# Patient Record
Sex: MALE | URBAN - METROPOLITAN AREA
[De-identification: names, ages, dates, MRNs, and addresses within clinical notes are randomized per-mention and may not be internally consistent; named-entity substitution may affect disease eponyms.]

---

## 2024-04-12 ENCOUNTER — HOSPITAL ENCOUNTER (OUTPATIENT)
Age: 34
Setting detail: SPECIMEN
Discharge: HOME | End: 2024-04-12

## 2024-04-12 ENCOUNTER — OFFICE VISIT (OUTPATIENT)
Dept: FAMILY MEDICINE CLINIC | Age: 34
End: 2024-04-12
Payer: COMMERCIAL

## 2024-04-12 VITALS
WEIGHT: 221 LBS | SYSTOLIC BLOOD PRESSURE: 130 MMHG | HEART RATE: 96 BPM | HEIGHT: 72 IN | DIASTOLIC BLOOD PRESSURE: 80 MMHG | TEMPERATURE: 98.1 F | BODY MASS INDEX: 29.93 KG/M2

## 2024-04-12 DIAGNOSIS — Z11.59 ENCOUNTER FOR HCV SCREENING TEST FOR HIGH RISK PATIENT: ICD-10-CM

## 2024-04-12 DIAGNOSIS — Z11.59 NEED FOR HEPATITIS B SCREENING TEST: ICD-10-CM

## 2024-04-12 DIAGNOSIS — B20 HISTORY OF HIV INFECTION (HCC): ICD-10-CM

## 2024-04-12 DIAGNOSIS — Z59.9 FINANCIAL DIFFICULTIES: ICD-10-CM

## 2024-04-12 DIAGNOSIS — Z86.39 HISTORY OF VITAMIN D DEFICIENCY: ICD-10-CM

## 2024-04-12 DIAGNOSIS — R53.82 CHRONIC FATIGUE: ICD-10-CM

## 2024-04-12 DIAGNOSIS — Z91.89 ENCOUNTER FOR HCV SCREENING TEST FOR HIGH RISK PATIENT: ICD-10-CM

## 2024-04-12 DIAGNOSIS — Z11.4 ENCOUNTER FOR SCREENING FOR HIV: ICD-10-CM

## 2024-04-12 DIAGNOSIS — F32.A DEPRESSION, UNSPECIFIED DEPRESSION TYPE: ICD-10-CM

## 2024-04-12 DIAGNOSIS — Z76.89 ENCOUNTER TO ESTABLISH CARE WITH NEW DOCTOR: Primary | ICD-10-CM

## 2024-04-12 DIAGNOSIS — M25.561 ACUTE PAIN OF RIGHT KNEE: ICD-10-CM

## 2024-04-12 PROBLEM — Z21 HISTORY OF HIV INFECTION (HCC): Status: ACTIVE | Noted: 2024-04-12

## 2024-04-12 LAB
25(OH)D3 SERPL-MCNC: 25.3 NG/ML (ref 30–100)
ALBUMIN SERPL-MCNC: 4.8 G/DL (ref 3.5–5.2)
ALBUMIN/GLOB SERPL: 1 {RATIO} (ref 1–2.5)
ALP SERPL-CCNC: 40 U/L (ref 40–129)
ALT SERPL-CCNC: 25 U/L (ref 10–50)
ANION GAP SERPL CALCULATED.3IONS-SCNC: 13 MMOL/L (ref 9–16)
AST SERPL-CCNC: 26 U/L (ref 10–50)
BASOPHILS # BLD: 0 K/UL (ref 0–0.2)
BASOPHILS NFR BLD: 0 % (ref 0–2)
BILIRUB SERPL-MCNC: 1 MG/DL (ref 0–1.2)
BUN SERPL-MCNC: 12 MG/DL (ref 6–20)
CALCIUM SERPL-MCNC: 9.8 MG/DL (ref 8.6–10.4)
CHLORIDE SERPL-SCNC: 101 MMOL/L (ref 98–107)
CO2 SERPL-SCNC: 22 MMOL/L (ref 20–31)
CREAT SERPL-MCNC: 1.1 MG/DL (ref 0.7–1.2)
EOSINOPHIL # BLD: 0.04 K/UL (ref 0–0.4)
EOSINOPHILS RELATIVE PERCENT: 1 % (ref 1–4)
ERYTHROCYTE [DISTWIDTH] IN BLOOD BY AUTOMATED COUNT: 12.4 % (ref 11.8–14.4)
GFR SERPL CREATININE-BSD FRML MDRD: >90 ML/MIN/1.73M2
GLUCOSE SERPL-MCNC: 75 MG/DL (ref 74–99)
HBV SURFACE AB SERPL IA-ACNC: 6.43 MIU/ML
HCT VFR BLD AUTO: 42.1 % (ref 40.7–50.3)
HCV AB SERPL QL IA: NONREACTIVE
HGB BLD-MCNC: 14.1 G/DL (ref 13–17)
HIV 1+2 AB+HIV1 P24 AG SERPL QL IA: REACTIVE
IMM GRANULOCYTES # BLD AUTO: 0 K/UL (ref 0–0.3)
IMM GRANULOCYTES NFR BLD: 0 %
LYMPHOCYTES NFR BLD: 2.16 K/UL (ref 1–4.8)
LYMPHOCYTES RELATIVE PERCENT: 62 % (ref 24–44)
MCH RBC QN AUTO: 30.6 PG (ref 25.2–33.5)
MCHC RBC AUTO-ENTMCNC: 33.5 G/DL (ref 28.4–34.8)
MCV RBC AUTO: 91.3 FL (ref 82.6–102.9)
MONOCYTES NFR BLD: 0.42 K/UL (ref 0.1–0.8)
MONOCYTES NFR BLD: 12 % (ref 1–7)
MORPHOLOGY: NORMAL
NEUTROPHILS NFR BLD: 25 % (ref 36–66)
NEUTS SEG NFR BLD: 0.88 K/UL (ref 1.8–7.7)
NRBC BLD-RTO: 0 PER 100 WBC
PLATELET # BLD AUTO: 192 K/UL (ref 138–453)
PMV BLD AUTO: 10.1 FL (ref 8.1–13.5)
POTASSIUM SERPL-SCNC: 4 MMOL/L (ref 3.7–5.3)
PROT SERPL-MCNC: 8.5 G/DL (ref 6.6–8.7)
RBC # BLD AUTO: 4.61 M/UL (ref 4.21–5.77)
SODIUM SERPL-SCNC: 136 MMOL/L (ref 136–145)
T PALLIDUM AB SER QL IA: REACTIVE
WBC OTHER # BLD: 3.5 K/UL (ref 3.5–11.3)

## 2024-04-12 PROCEDURE — 99202 OFFICE O/P NEW SF 15 MIN: CPT

## 2024-04-12 RX ORDER — ESCITALOPRAM OXALATE 20 MG/1
20 TABLET ORAL DAILY
Qty: 30 TABLET | Refills: 0 | Status: SHIPPED | OUTPATIENT
Start: 2024-04-12

## 2024-04-12 RX ORDER — IBUPROFEN 400 MG/1
400 TABLET ORAL 3 TIMES DAILY PRN
Qty: 60 TABLET | Refills: 0 | Status: SHIPPED | OUTPATIENT
Start: 2024-04-12 | End: 2024-04-12 | Stop reason: SINTOL

## 2024-04-12 RX ORDER — LANOLIN ALCOHOL/MO/W.PET/CERES
3 CREAM (GRAM) TOPICAL DAILY
Qty: 30 TABLET | Refills: 0 | Status: SHIPPED | OUTPATIENT
Start: 2024-04-12

## 2024-04-12 SDOH — ECONOMIC STABILITY: FOOD INSECURITY: WITHIN THE PAST 12 MONTHS, YOU WORRIED THAT YOUR FOOD WOULD RUN OUT BEFORE YOU GOT MONEY TO BUY MORE.: OFTEN TRUE

## 2024-04-12 SDOH — ECONOMIC STABILITY - INCOME SECURITY: PROBLEM RELATED TO HOUSING AND ECONOMIC CIRCUMSTANCES, UNSPECIFIED: Z59.9

## 2024-04-12 SDOH — ECONOMIC STABILITY: HOUSING INSECURITY
IN THE LAST 12 MONTHS, WAS THERE A TIME WHEN YOU DID NOT HAVE A STEADY PLACE TO SLEEP OR SLEPT IN A SHELTER (INCLUDING NOW)?: YES

## 2024-04-12 SDOH — ECONOMIC STABILITY: FOOD INSECURITY: WITHIN THE PAST 12 MONTHS, THE FOOD YOU BOUGHT JUST DIDN'T LAST AND YOU DIDN'T HAVE MONEY TO GET MORE.: OFTEN TRUE

## 2024-04-12 SDOH — ECONOMIC STABILITY: INCOME INSECURITY: HOW HARD IS IT FOR YOU TO PAY FOR THE VERY BASICS LIKE FOOD, HOUSING, MEDICAL CARE, AND HEATING?: VERY HARD

## 2024-04-12 ASSESSMENT — ANXIETY QUESTIONNAIRES
6. BECOMING EASILY ANNOYED OR IRRITABLE: MORE THAN HALF THE DAYS
1. FEELING NERVOUS, ANXIOUS, OR ON EDGE: MORE THAN HALF THE DAYS
3. WORRYING TOO MUCH ABOUT DIFFERENT THINGS: MORE THAN HALF THE DAYS
7. FEELING AFRAID AS IF SOMETHING AWFUL MIGHT HAPPEN: MORE THAN HALF THE DAYS
5. BEING SO RESTLESS THAT IT IS HARD TO SIT STILL: NOT AT ALL
4. TROUBLE RELAXING: MORE THAN HALF THE DAYS
GAD7 TOTAL SCORE: 12
2. NOT BEING ABLE TO STOP OR CONTROL WORRYING: MORE THAN HALF THE DAYS

## 2024-04-12 ASSESSMENT — PATIENT HEALTH QUESTIONNAIRE - PHQ9
SUM OF ALL RESPONSES TO PHQ QUESTIONS 1-9: 0
SUM OF ALL RESPONSES TO PHQ9 QUESTIONS 1 & 2: 0
1. LITTLE INTEREST OR PLEASURE IN DOING THINGS: NOT AT ALL
SUM OF ALL RESPONSES TO PHQ QUESTIONS 1-9: 0
SUM OF ALL RESPONSES TO PHQ QUESTIONS 1-9: 0
2. FEELING DOWN, DEPRESSED OR HOPELESS: NOT AT ALL
SUM OF ALL RESPONSES TO PHQ QUESTIONS 1-9: 0

## 2024-04-12 NOTE — PROGRESS NOTES
Attending Physician Statement  I  have discussed the care of Dereje Carrion including pertinent history and exam findings with the resident. I agree with the assessment, plan and orders as documented by the resident.      /80 (Site: Right Upper Arm, Position: Sitting, Cuff Size: Large Adult)   Pulse 96   Temp 98.1 °F (36.7 °C) (Oral)   Ht 1.829 m (6')   Wt 100.2 kg (221 lb)   BMI 29.97 kg/m²    BP Readings from Last 3 Encounters:   04/12/24 130/80     Wt Readings from Last 3 Encounters:   04/12/24 100.2 kg (221 lb)          Diagnosis Orders   1. Encounter to establish care with new doctor        2. History of vitamin D deficiency  Vitamin D 25 Hydroxy      3. Financial difficulties  [facility] for Social Determinants of Health (Primary Care Practices)      4. Encounter for screening for HIV        5. Encounter for HCV screening test for high risk patient  Hepatitis C Antibody      6. Chronic fatigue  CBC with Auto Differential    Vitamin D 25 Hydroxy    Comprehensive Metabolic Panel      7. Acute pain of right knee  XR KNEE RIGHT (1-2 VIEWS)    DISCONTINUED: ibuprofen (ADVIL;MOTRIN) 400 MG tablet      8. History of HIV infection (HCC)  HIV RNA, Quantitative, PCR    Comprehensive Metabolic Panel    CD4 Percent and Absolute    T. Pallidum Ab    Mitzi Sadler MD, Infectious Disease, [place]      9. Need for hepatitis B screening test  Hepatitis B Surface Antibody    bictegravir-emtricitab-tenofovir alafenamide (BIKTARVY) -25 MG TABS per tablet      10. Depression, unspecified depression type  escitalopram (LEXAPRO) 20 MG tablet    melatonin (RA MELATONIN) 3 MG TABS tablet              Vilma Salcedo DO 4/15/2024 10:35 AM      
Visit Information    Have you changed or started any medications since your last visit including any over-the-counter medicines, vitamins, or herbal medicines? no   Have you stopped taking any of your medications? Is so, why? -  no  Are you having any side effects from any of your medications? - no    Have you seen any other physician or provider since your last visit?  no   Have you had any other diagnostic tests since your last visit?  no   Have you been seen in the emergency room and/or had an admission in a hospital since we last saw you?  no   Have you had your routine dental cleaning in the past 6 months?  no     Do you have an active MyChart account? If no, what is the barrier?  Yes    No care team member to display    Medical History Review  Past Medical, Family, and Social History reviewed and does not contribute to the patient presenting condition    Health Maintenance   Topic Date Due    Hepatitis B vaccine (1 of 3 - 3-dose series) Never done    COVID-19 Vaccine (1) Never done    Varicella vaccine (1 of 2 - 2-dose childhood series) Never done    Depression Screen  Never done    HIV screen  Never done    Hepatitis C screen  Never done    DTaP/Tdap/Td vaccine (1 - Tdap) Never done    Flu vaccine (Season Ended) 08/01/2024    Hepatitis A vaccine  Aged Out    Hib vaccine  Aged Out    HPV vaccine  Aged Out    Polio vaccine  Aged Out    Meningococcal (ACWY) vaccine  Aged Out    Pneumococcal 0-64 years Vaccine  Aged Out             
any previous history of needle sharing.    9. Need for hepatitis B screening test    - Hepatitis B Surface Antibody; Future  - bictegravir-emtricitab-tenofovir alafenamide (BIKTARVY) -25 MG TABS per tablet; Take 1 tablet by mouth daily  Dispense: 30 tablet; Refill: 0    10. Depression, unspecified depression type    - escitalopram (LEXAPRO) 20 MG tablet; Take 1 tablet by mouth daily  Dispense: 30 tablet; Refill: 0  - melatonin (RA MELATONIN) 3 MG TABS tablet; Take 1 tablet by mouth daily  Dispense: 30 tablet; Refill: 0          Requested Prescriptions     Signed Prescriptions Disp Refills    ibuprofen (ADVIL;MOTRIN) 400 MG tablet 60 tablet 0     Sig: Take 1 tablet by mouth 3 times daily as needed for Pain    bictegravir-emtricitab-tenofovir alafenamide (BIKTARVY) -25 MG TABS per tablet 30 tablet 0     Sig: Take 1 tablet by mouth daily    escitalopram (LEXAPRO) 20 MG tablet 30 tablet 0     Sig: Take 1 tablet by mouth daily    melatonin (RA MELATONIN) 3 MG TABS tablet 30 tablet 0     Sig: Take 1 tablet by mouth daily       There are no discontinued medications.    Dereje received counseling on the following healthy behaviors: nutrition, exercise and medication adherence    Discussed use,benefit, and side effects of prescribed medications.  Barriers to medication compliance addressed.      All patient questions answered.  Pt voiced understanding.     Return in about 4 weeks (around 5/10/2024) for smoking cessation.        Disclaimer: Some orall of this note was transcribed using voice-recognition software.This may cause typographical errors occasionally. Although all effort is made to fix these errors, please do not hesitate to contact our office if there isany concern with the understanding of this note.

## 2024-04-13 LAB
CD3+CD4+ CELLS # BLD: 477 /UL (ref 309–1571)
CD3+CD4+ CELLS NFR BLD: 22 % (ref 27–64)
LYMPHOCYTES # BLD: 62 % (ref 24–44)
WBC # BLD: 3.5 K/UL (ref 3.5–11)

## 2024-04-14 LAB
HIV1 RNA # SERPL NAA+PROBE: 1900 CPY/ML
HIV1 RNA # SERPL NAA+PROBE: DETECTED {COPIES}/ML
HIV1 RNA SERPL NAA+PROBE-LOG#: 3.28 LOG CPY/ML
SPECIMEN SOURCE: ABNORMAL

## 2024-04-15 ENCOUNTER — TELEPHONE (OUTPATIENT)
Dept: FAMILY MEDICINE CLINIC | Age: 34
End: 2024-04-15

## 2024-04-15 LAB
HIV 1 & 2 AB SERPLBLD IA.RAPID: ABNORMAL
HIV 2 AB SERPLBLD QL IA.RAPID: NEGATIVE
HIV1 AB SERPLBLD QL IA.RAPID: POSITIVE

## 2024-04-15 NOTE — TELEPHONE ENCOUNTER
Dereje Carrion was contacted by Kari Infante MA, a Community Health Navigator, regarding a Social Determinants of Health referral.     Writer was unable to leave a message. (Voicemail full/VM not set up/busy signal)    Follow-up attempt.Writer unable to reach patient by phone, due to phone restrictions. Writer will mail no contact letter.    [address]   [place], [place] [address]    April 15, 2024    Dereje Carrion  [address], [place] [address]      Dear Dereje:    Your Primary Care Provider placed a referral to connect you with a Community Health Worker for help with resources, but we have been unable to reach you.     If you still need help, please call [phone] by April 29, 2024.  After this date, you will need to ask your Primary Care Provider for a new referral.    For immediate assistance, please call [employer] at 211.      Sincerely,      Kari MIX (AMT)  Community Health Worker  [phone]

## 2024-04-16 ENCOUNTER — TELEPHONE (OUTPATIENT)
Dept: FAMILY MEDICINE CLINIC | Age: 34
End: 2024-04-16

## 2024-04-16 LAB — VDRL SER-TITR: 1 {TITER}

## 2024-04-16 NOTE — TELEPHONE ENCOUNTER
I have tried reaching out to patient to discuss recent lab results. Unfortunately, the phone number listed is not a correct one and was not able to reach out to patient. MA will try to mail a letter to patient current mailing address to reach out to us and place a correct phone number or do an office visit.   Patient should reach out to infectious disease specialist very soon too. Patient is positive for HIV and syphilis. Syphilis TB antibody and VDRL are both positive. Patient should see infectious disease for management of syphilis. Patient was given referral for infectious disease specialist as patient did report that he has history of HIV.  Patient was sent from the office with a prescription of Biktarvy but was instructed to call the infectious disease specialist and schedule an appointment.  Patient's viral load is around 1900 which means that patient is not well treated for HIV.  Patient also to bring in all his immunization record as he is not immune to hepatitis B and should continue to take hepatitis B series vaccination.

## 2024-04-17 ENCOUNTER — TELEPHONE (OUTPATIENT)
Dept: FAMILY MEDICINE CLINIC | Age: 34
End: 2024-04-17

## 2024-04-17 NOTE — TELEPHONE ENCOUNTER
( MS. Swanson) will mail out letter to patient address in chart. Patient does have appointment 5-7-24.

## 2024-04-24 ENCOUNTER — TELEPHONE (OUTPATIENT)
Dept: FAMILY MEDICINE CLINIC | Age: 34
End: 2024-04-24

## 2024-04-24 NOTE — TELEPHONE ENCOUNTER
Basim Barron /  with [facility] wanted to find out if provider had gotten a hold of patient , He has been trying to contact patient , but with no luck . Mr. Barron stated that patient is not needing any treatment at this time.  But he would like you to reach out to him. .   Please advise and thank you.

## 2024-05-12 PROBLEM — Z11.59 NEED FOR HEPATITIS B SCREENING TEST: Status: RESOLVED | Noted: 2024-04-12 | Resolved: 2024-05-12

## 2024-05-12 PROBLEM — Z11.4 ENCOUNTER FOR SCREENING FOR HIV: Status: RESOLVED | Noted: 2024-04-12 | Resolved: 2024-05-12

## 2024-05-13 ENCOUNTER — HOSPITAL ENCOUNTER (OUTPATIENT)
Age: 34
Setting detail: SPECIMEN
Discharge: HOME | End: 2024-05-13

## 2024-05-13 ENCOUNTER — OFFICE VISIT (OUTPATIENT)
Dept: FAMILY MEDICINE CLINIC | Age: 34
End: 2024-05-13
Payer: COMMERCIAL

## 2024-05-13 VITALS
OXYGEN SATURATION: 98 % | DIASTOLIC BLOOD PRESSURE: 83 MMHG | HEART RATE: 96 BPM | TEMPERATURE: 99.4 F | BODY MASS INDEX: 29.93 KG/M2 | WEIGHT: 221 LBS | HEIGHT: 72 IN | SYSTOLIC BLOOD PRESSURE: 136 MMHG

## 2024-05-13 DIAGNOSIS — Z11.59 NEED FOR HEPATITIS B SCREENING TEST: ICD-10-CM

## 2024-05-13 DIAGNOSIS — A51.9 EARLY SYPHILIS: ICD-10-CM

## 2024-05-13 DIAGNOSIS — Z21 ASYMPTOMATIC HIV INFECTION, WITH NO HISTORY OF HIV-RELATED ILLNESS (HCC): Primary | ICD-10-CM

## 2024-05-13 DIAGNOSIS — F32.A DEPRESSION, UNSPECIFIED DEPRESSION TYPE: ICD-10-CM

## 2024-05-13 DIAGNOSIS — Z21 ASYMPTOMATIC HIV INFECTION, WITH NO HISTORY OF HIV-RELATED ILLNESS (HCC): ICD-10-CM

## 2024-05-13 LAB
ALBUMIN SERPL-MCNC: 4.6 G/DL (ref 3.5–5.2)
ALBUMIN/GLOB SERPL: 1 {RATIO} (ref 1–2.5)
ALP SERPL-CCNC: 54 U/L (ref 40–129)
ALT SERPL-CCNC: 35 U/L (ref 10–50)
ANION GAP SERPL CALCULATED.3IONS-SCNC: 13 MMOL/L (ref 9–16)
AST SERPL-CCNC: 29 U/L (ref 10–50)
BILIRUB SERPL-MCNC: 0.8 MG/DL (ref 0–1.2)
BUN SERPL-MCNC: 13 MG/DL (ref 6–20)
CALCIUM SERPL-MCNC: 9.7 MG/DL (ref 8.6–10.4)
CHLORIDE SERPL-SCNC: 102 MMOL/L (ref 98–107)
CHOLEST SERPL-MCNC: 174 MG/DL (ref 0–199)
CHOLESTEROL/HDL RATIO: 3
CO2 SERPL-SCNC: 23 MMOL/L (ref 20–31)
CREAT SERPL-MCNC: 1.2 MG/DL (ref 0.7–1.2)
EST. AVERAGE GLUCOSE BLD GHB EST-MCNC: 97 MG/DL
GFR, ESTIMATED: 85 ML/MIN/1.73M2
GLUCOSE SERPL-MCNC: 87 MG/DL (ref 74–99)
HBA1C MFR BLD: 5 % (ref 4–6)
HDLC SERPL-MCNC: 65 MG/DL
LDLC SERPL CALC-MCNC: 87 MG/DL (ref 0–100)
POTASSIUM SERPL-SCNC: 4.2 MMOL/L (ref 3.7–5.3)
PROT SERPL-MCNC: 8.4 G/DL (ref 6.6–8.7)
SODIUM SERPL-SCNC: 138 MMOL/L (ref 136–145)
TRIGL SERPL-MCNC: 112 MG/DL
VLDLC SERPL CALC-MCNC: 22 MG/DL

## 2024-05-13 PROCEDURE — 99211 OFF/OP EST MAY X REQ PHY/QHP: CPT | Performed by: FAMILY MEDICINE

## 2024-05-13 PROCEDURE — 99213 OFFICE O/P EST LOW 20 MIN: CPT

## 2024-05-13 RX ORDER — DIPHENHYDRAMINE HCL 25 MG
25 CAPSULE ORAL EVERY 6 HOURS PRN
Qty: 10 CAPSULE | Refills: 0 | Status: SHIPPED | OUTPATIENT
Start: 2024-05-13 | End: 2024-05-23

## 2024-05-13 RX ORDER — DOXYCYCLINE HYCLATE 100 MG
100 TABLET ORAL 2 TIMES DAILY
Qty: 28 TABLET | Refills: 0 | Status: SHIPPED | OUTPATIENT
Start: 2024-05-13 | End: 2024-05-27

## 2024-05-13 RX ORDER — ESCITALOPRAM OXALATE 20 MG/1
20 TABLET ORAL DAILY
Qty: 30 TABLET | Refills: 0 | Status: SHIPPED | OUTPATIENT
Start: 2024-05-13

## 2024-05-13 RX ORDER — LANOLIN ALCOHOL/MO/W.PET/CERES
3 CREAM (GRAM) TOPICAL DAILY
Qty: 30 TABLET | Refills: 0 | Status: SHIPPED | OUTPATIENT
Start: 2024-05-13

## 2024-05-13 ASSESSMENT — ENCOUNTER SYMPTOMS
CONSTIPATION: 0
DIARRHEA: 0
WHEEZING: 0
COUGH: 0
BLOOD IN STOOL: 0
VOMITING: 0
NAUSEA: 0
ABDOMINAL DISTENTION: 0
ABDOMINAL PAIN: 0

## 2024-05-13 ASSESSMENT — PATIENT HEALTH QUESTIONNAIRE - PHQ9
SUM OF ALL RESPONSES TO PHQ QUESTIONS 1-9: 5
6. FEELING BAD ABOUT YOURSELF - OR THAT YOU ARE A FAILURE OR HAVE LET YOURSELF OR YOUR FAMILY DOWN: NOT AT ALL
1. LITTLE INTEREST OR PLEASURE IN DOING THINGS: NOT AT ALL
SUM OF ALL RESPONSES TO PHQ QUESTIONS 1-9: 5
3. TROUBLE FALLING OR STAYING ASLEEP: SEVERAL DAYS
7. TROUBLE CONCENTRATING ON THINGS, SUCH AS READING THE NEWSPAPER OR WATCHING TELEVISION: SEVERAL DAYS
SUM OF ALL RESPONSES TO PHQ QUESTIONS 1-9: 5
SUM OF ALL RESPONSES TO PHQ9 QUESTIONS 1 & 2: 0
5. POOR APPETITE OR OVEREATING: NOT AT ALL
SUM OF ALL RESPONSES TO PHQ QUESTIONS 1-9: 5
9. THOUGHTS THAT YOU WOULD BE BETTER OFF DEAD, OR OF HURTING YOURSELF: NOT AT ALL
2. FEELING DOWN, DEPRESSED OR HOPELESS: NOT AT ALL
4. FEELING TIRED OR HAVING LITTLE ENERGY: SEVERAL DAYS
8. MOVING OR SPEAKING SO SLOWLY THAT OTHER PEOPLE COULD HAVE NOTICED. OR THE OPPOSITE, BEING SO FIGETY OR RESTLESS THAT YOU HAVE BEEN MOVING AROUND A LOT MORE THAN USUAL: MORE THAN HALF THE DAYS
10. IF YOU CHECKED OFF ANY PROBLEMS, HOW DIFFICULT HAVE THESE PROBLEMS MADE IT FOR YOU TO DO YOUR WORK, TAKE CARE OF THINGS AT HOME, OR GET ALONG WITH OTHER PEOPLE: NOT DIFFICULT AT ALL

## 2024-05-13 NOTE — PATIENT INSTRUCTIONS
Thank you for letting us take care of you today. We hope all your questions were addressed. If a question was overlooked or something else comes to mind after you return home, please contact a member of your Care Team listed below.        Your Care Team at [facility] is Team #4  Ren Torres (Faculty)  Devin Mccullough (Resident)  Jaiden Ontiveros (Resident)  Scar Hagen (Resident)  Constance Orozco (Resident)  Licha Breen, FABRICIO Amaro, FABRICIO Portillo, FABRICIO Chew, [facility]  Barbara Flores, [facility]  Lauren Escobar, [facility]  Caitlin Molina, [facility]  Marlee Ken, FABRICIO Heart () MARIA DEL ROSARIO Cherry (Clinical Practice Manager)  Tere Friedman [facility] (Clinical Pharmacist)       Office phone number: [phone]    If you need to get in right away due to illness, please be advised we have \"Same Day\" appointments available Monday-Friday. Please call us at [phone] option #3 to schedule your \"Same Day\" appointment.

## 2024-05-14 LAB
BACTERIA URNS QL MICRO: NORMAL
BILIRUB UR QL STRIP: NEGATIVE
CASTS #/AREA URNS LPF: NORMAL /LPF (ref 0–8)
CLARITY UR: CLEAR
COLOR UR: YELLOW
EPI CELLS #/AREA URNS HPF: NORMAL /HPF (ref 0–5)
GLUCOSE UR STRIP-MCNC: NEGATIVE MG/DL
HGB UR QL STRIP.AUTO: NEGATIVE
KETONES UR STRIP-MCNC: NEGATIVE MG/DL
LEUKOCYTE ESTERASE UR QL STRIP: NEGATIVE
NITRITE UR QL STRIP: NEGATIVE
PH UR STRIP: 6 [PH] (ref 5–8)
PROT UR STRIP-MCNC: NEGATIVE MG/DL
RBC #/AREA URNS HPF: NORMAL /HPF (ref 0–4)
SP GR UR STRIP: 1.01 (ref 1–1.03)
UROBILINOGEN UR STRIP-ACNC: NORMAL EU/DL (ref 0–1)
WBC #/AREA URNS HPF: NORMAL /HPF (ref 0–5)

## 2024-06-12 PROBLEM — Z11.59 NEED FOR HEPATITIS B SCREENING TEST: Status: RESOLVED | Noted: 2024-04-12 | Resolved: 2024-06-12

## 2024-07-16 DIAGNOSIS — F32.A DEPRESSION, UNSPECIFIED DEPRESSION TYPE: ICD-10-CM

## 2024-07-16 DIAGNOSIS — Z11.59 NEED FOR HEPATITIS B SCREENING TEST: ICD-10-CM

## 2024-07-16 RX ORDER — ESCITALOPRAM OXALATE 20 MG/1
20 TABLET ORAL DAILY
Qty: 30 TABLET | Refills: 0 | Status: SHIPPED | OUTPATIENT
Start: 2024-07-16

## 2024-07-16 NOTE — TELEPHONE ENCOUNTER
Last visit:   Last Med refill:   Does patient have enough medication for 72 hours: No:     Next Visit Date:  No future appointments.    Health Maintenance   Topic Date Due    Hepatitis B vaccine (1 of 3 - 3-dose series) Never done    Varicella vaccine (1 of 2 - 2-dose childhood series) Never done    Meningococcal (ACWY) vaccine (1 - Risk 2-dose series) Never done    COVID-19 Vaccine (1) Never done    Pneumococcal 0-64 years Vaccine (1 of 2 - PCV) Never done    Measles,Mumps,Rubella (MMR) vaccine (1 of 2 - Risk 2-dose series) Never done    Hepatitis A vaccine (1 of 2 - Risk 2-dose series) Never done    DTaP/Tdap/Td vaccine (1 - Tdap) Never done    Shingles vaccine (1 of 2) Never done    Flu vaccine (1) 08/01/2024    Depression Monitoring  05/13/2025    Hepatitis C screen  Completed    Hib vaccine  Aged Out    HPV vaccine  Aged Out    Polio vaccine  Aged Out    Depression Screen  Discontinued    HIV screen  Discontinued       Hemoglobin A1C (%)   Date Value   05/13/2024 5.0             ( goal A1C is < 7)   No components found for: \"LABMICR\"  No components found for: \"LDLCHOLESTEROL\", \"LDLCALC\"    (goal LDL is <100)   AST (U/L)   Date Value   05/13/2024 29     ALT (U/L)   Date Value   05/13/2024 35     BUN (mg/dL)   Date Value   05/13/2024 13     BP Readings from Last 3 Encounters:   05/13/24 136/83   04/12/24 130/80          (goal 120/80)    All Future Testing planned in [facility]  Lab Frequency Next Occurrence   XR KNEE RIGHT (1-2 VIEWS) Once 04/12/2024               Patient Active Problem List:     Encounter to establish care with new doctor     History of vitamin D deficiency     Financial difficulties     Encounter for HCV screening test for high risk patient     Chronic fatigue     Acute pain of right knee     History of HIV infection (HCC)     Depression           Please address the medication refill and close the encounter.  If I can be of assistance, please route to the applicable pool.      Thank you.

## 2024-08-16 DIAGNOSIS — F32.A DEPRESSION, UNSPECIFIED DEPRESSION TYPE: ICD-10-CM

## 2024-08-16 DIAGNOSIS — Z11.59 NEED FOR HEPATITIS B SCREENING TEST: ICD-10-CM

## 2024-08-16 NOTE — TELEPHONE ENCOUNTER
Last visit: 05/13/2024  Last Med refill: 07/16/2024  Does patient have enough medication for 72 hours: No:  patient is asking to have an increase dosing on his lexapro.  Please advise and thank you    Next Visit Date:  No future appointments.    Health Maintenance   Topic Date Due    Meningococcal (ACWY) vaccine (1 - Risk 2-dose series) Never done    COVID-19 Vaccine (1) Never done    Pneumococcal 0-64 years Vaccine (1 of 2 - PCV) Never done    Varicella vaccine (1 of 2 - 13+ 2-dose series) Never done    Measles,Mumps,Rubella (MMR) vaccine (1 of 2 - Risk 2-dose series) Never done    Hepatitis A vaccine (1 of 2 - Risk 2-dose series) Never done    Hepatitis B vaccine (1 of 3 - 19+ 3-dose series) Never done    DTaP/Tdap/Td vaccine (1 - Tdap) Never done    Shingles vaccine (1 of 2) Never done    Flu vaccine (1) Never done    Depression Monitoring  05/13/2025    Hepatitis C screen  Completed    Hib vaccine  Aged Out    HPV vaccine  Aged Out    Polio vaccine  Aged Out    Depression Screen  Discontinued    HIV screen  Discontinued       Hemoglobin A1C (%)   Date Value   05/13/2024 5.0             ( goal A1C is < 7)   No components found for: \"LABMICR\"  No components found for: \"LDLCHOLESTEROL\", \"LDLCALC\"    (goal LDL is <100)   AST (U/L)   Date Value   05/13/2024 29     ALT (U/L)   Date Value   05/13/2024 35     BUN (mg/dL)   Date Value   05/13/2024 13     BP Readings from Last 3 Encounters:   05/13/24 136/83   04/12/24 130/80          (goal 120/80)    All Future Testing planned in [facility]  Lab Frequency Next Occurrence   XR KNEE RIGHT (1-2 VIEWS) Once 04/12/2024               Patient Active Problem List:     Encounter to establish care with new doctor     History of vitamin D deficiency     Financial difficulties     Encounter for HCV screening test for high risk patient     Chronic fatigue     Acute pain of right knee     History of HIV infection (HCC)     Depression

## 2024-08-17 RX ORDER — ESCITALOPRAM OXALATE 20 MG/1
20 TABLET ORAL DAILY
Qty: 30 TABLET | Refills: 0 | Status: SHIPPED | OUTPATIENT
Start: 2024-08-17

## 2024-08-17 NOTE — TELEPHONE ENCOUNTER
Patient should estbalish care with infectious disease. I will refill Biktarvy now but will need to establish care with infectious disease and follow up with them for further refills.

## 2024-10-08 DIAGNOSIS — F32.A DEPRESSION, UNSPECIFIED DEPRESSION TYPE: ICD-10-CM

## 2024-10-08 DIAGNOSIS — Z11.59 NEED FOR HEPATITIS B SCREENING TEST: ICD-10-CM

## 2024-10-08 RX ORDER — ESCITALOPRAM OXALATE 20 MG/1
20 TABLET ORAL DAILY
Qty: 30 TABLET | Refills: 0 | Status: SHIPPED | OUTPATIENT
Start: 2024-10-08

## 2024-12-05 ENCOUNTER — TELEPHONE (OUTPATIENT)
Dept: FAMILY MEDICINE CLINIC | Age: 34
End: 2024-12-05

## 2024-12-05 NOTE — TELEPHONE ENCOUNTER
Patient called office in regards to med refill/anxiety and to schedule an appt. Writer assisted with mychart and helped patient set up his appointment. Patient would like a sooner appt as would like to discuss increase on his anxiety medication or a diff one. Patient is scheduled on 12-10-24.

## 2024-12-10 DIAGNOSIS — Z11.59 NEED FOR HEPATITIS B SCREENING TEST: ICD-10-CM

## 2024-12-10 DIAGNOSIS — F32.A DEPRESSION, UNSPECIFIED DEPRESSION TYPE: ICD-10-CM

## 2024-12-10 NOTE — TELEPHONE ENCOUNTER
Due to transportation picking patient up late, his appt on 12/10/24, needed to be rescheduled but patient needs his refills and was rescheduled for his appt on 12/17/24.    yes...

## 2024-12-11 RX ORDER — ESCITALOPRAM OXALATE 20 MG/1
20 TABLET ORAL DAILY
Qty: 30 TABLET | Refills: 0 | Status: SHIPPED | OUTPATIENT
Start: 2024-12-11

## 2024-12-11 NOTE — TELEPHONE ENCOUNTER
Patient was provided for only 10 days supply of Biktarvy (HIV medication). Patient was informed the importance of establishing care with infectious disease and has not done that yet. Patient will have to get his management of HIV from infectious disease moving forward. Thank you.

## 2024-12-17 ENCOUNTER — OFFICE VISIT (OUTPATIENT)
Dept: FAMILY MEDICINE CLINIC | Age: 34
End: 2024-12-17
Payer: COMMERCIAL

## 2024-12-17 VITALS
TEMPERATURE: 97.3 F | BODY MASS INDEX: 32 KG/M2 | DIASTOLIC BLOOD PRESSURE: 89 MMHG | HEART RATE: 89 BPM | SYSTOLIC BLOOD PRESSURE: 128 MMHG | WEIGHT: 236 LBS

## 2024-12-17 DIAGNOSIS — Z21 ASYMPTOMATIC HIV INFECTION, WITH NO HISTORY OF HIV-RELATED ILLNESS (HCC): ICD-10-CM

## 2024-12-17 DIAGNOSIS — A51.9 EARLY SYPHILIS: Primary | ICD-10-CM

## 2024-12-17 DIAGNOSIS — F32.A DEPRESSION, UNSPECIFIED DEPRESSION TYPE: ICD-10-CM

## 2024-12-17 DIAGNOSIS — Z11.59 NEED FOR HEPATITIS B SCREENING TEST: ICD-10-CM

## 2024-12-17 DIAGNOSIS — F41.1 GAD (GENERALIZED ANXIETY DISORDER): ICD-10-CM

## 2024-12-17 DIAGNOSIS — Z59.41 FOOD INSECURITY: ICD-10-CM

## 2024-12-17 PROCEDURE — 99211 OFF/OP EST MAY X REQ PHY/QHP: CPT

## 2024-12-17 PROCEDURE — 99213 OFFICE O/P EST LOW 20 MIN: CPT

## 2024-12-17 RX ORDER — ESCITALOPRAM OXALATE 20 MG/1
20 TABLET ORAL DAILY
Qty: 30 TABLET | Refills: 0 | Status: SHIPPED | OUTPATIENT
Start: 2024-12-17

## 2024-12-17 RX ORDER — BUSPIRONE HYDROCHLORIDE 10 MG/1
10 TABLET ORAL 2 TIMES DAILY
Qty: 60 TABLET | Refills: 0 | Status: SHIPPED | OUTPATIENT
Start: 2024-12-17 | End: 2025-01-16

## 2024-12-17 SDOH — ECONOMIC STABILITY: FOOD INSECURITY: WITHIN THE PAST 12 MONTHS, THE FOOD YOU BOUGHT JUST DIDN'T LAST AND YOU DIDN'T HAVE MONEY TO GET MORE.: OFTEN TRUE

## 2024-12-17 SDOH — ECONOMIC STABILITY: INCOME INSECURITY: HOW HARD IS IT FOR YOU TO PAY FOR THE VERY BASICS LIKE FOOD, HOUSING, MEDICAL CARE, AND HEATING?: VERY HARD

## 2024-12-17 SDOH — ECONOMIC STABILITY: FOOD INSECURITY: WITHIN THE PAST 12 MONTHS, YOU WORRIED THAT YOUR FOOD WOULD RUN OUT BEFORE YOU GOT MONEY TO BUY MORE.: OFTEN TRUE

## 2024-12-17 SDOH — ECONOMIC STABILITY - FOOD INSECURITY: FOOD INSECURITY: Z59.41

## 2024-12-17 ASSESSMENT — PATIENT HEALTH QUESTIONNAIRE - PHQ9
SUM OF ALL RESPONSES TO PHQ QUESTIONS 1-9: 1
SUM OF ALL RESPONSES TO PHQ QUESTIONS 1-9: 1
2. FEELING DOWN, DEPRESSED OR HOPELESS: NOT AT ALL
SUM OF ALL RESPONSES TO PHQ9 QUESTIONS 1 & 2: 1
SUM OF ALL RESPONSES TO PHQ QUESTIONS 1-9: 1
1. LITTLE INTEREST OR PLEASURE IN DOING THINGS: SEVERAL DAYS
SUM OF ALL RESPONSES TO PHQ QUESTIONS 1-9: 1

## 2024-12-17 ASSESSMENT — ANXIETY QUESTIONNAIRES
2. NOT BEING ABLE TO STOP OR CONTROL WORRYING: MORE THAN HALF THE DAYS
IF YOU CHECKED OFF ANY PROBLEMS ON THIS QUESTIONNAIRE, HOW DIFFICULT HAVE THESE PROBLEMS MADE IT FOR YOU TO DO YOUR WORK, TAKE CARE OF THINGS AT HOME, OR GET ALONG WITH OTHER PEOPLE: SOMEWHAT DIFFICULT
6. BECOMING EASILY ANNOYED OR IRRITABLE: MORE THAN HALF THE DAYS
4. TROUBLE RELAXING: SEVERAL DAYS
3. WORRYING TOO MUCH ABOUT DIFFERENT THINGS: SEVERAL DAYS
7. FEELING AFRAID AS IF SOMETHING AWFUL MIGHT HAPPEN: SEVERAL DAYS
GAD7 TOTAL SCORE: 8
1. FEELING NERVOUS, ANXIOUS, OR ON EDGE: SEVERAL DAYS
5. BEING SO RESTLESS THAT IT IS HARD TO SIT STILL: NOT AT ALL

## 2024-12-17 NOTE — PROGRESS NOTES
Attending Physician Statement  I have discussed the care of Dereje Carrion, including pertinent history and exam findings,  with the resident. I have reviewed the key elements of all parts of the encounter with the resident.  I agree with the assessment, plan and orders as documented by the resident.  (GE Modifier)    Tresa Long MD  
Visit Information    Have you changed or started any medications since your last visit including any over-the-counter medicines, vitamins, or herbal medicines? no   Have you stopped taking any of your medications? Is so, why? -  no  Are you having any side effects from any of your medications? - no    Have you seen any other physician or provider since your last visit?  no   Have you had any other diagnostic tests since your last visit?  no   Have you been seen in the emergency room and/or had an admission in a hospital since we last saw you?  no   Have you had your routine dental cleaning in the past 6 months?  no     Do you have an active MyChart account? If no, what is the barrier?  No:     Patient Care Team:  Kathleen Hudson MD as PCP - General (Family Medicine)    Medical History Review  Past Medical, Family, and Social History reviewed and does not contribute to the patient presenting condition    Health Maintenance   Topic Date Due    Meningococcal (ACWY) vaccine (1 - Risk 2-dose series) Never done    COVID-19 Vaccine (1) Never done    Pneumococcal 0-64 years Vaccine (1 of 2 - PCV) Never done    Varicella vaccine (1 of 2 - 13+ 2-dose series) Never done    Measles,Mumps,Rubella (MMR) vaccine (1 of 2 - Risk 2-dose series) Never done    Hepatitis A vaccine (1 of 2 - Risk 2-dose series) Never done    Hepatitis B vaccine (1 of 3 - 19+ 3-dose series) Never done    DTaP/Tdap/Td vaccine (1 - Tdap) Never done    Shingles vaccine (1 of 2) Never done    Flu vaccine (1) Never done    Depression Monitoring  05/13/2025    Hepatitis C screen  Completed    Hib vaccine  Aged Out    HPV vaccine  Aged Out    Polio vaccine  Aged Out    Depression Screen  Discontinued    HIV screen  Discontinued             
illness (HCC)  -Patient on Biktarvy, has not followed up with infectious, would benefit from infectious referral.  For disease monitoring and progression  - Mitzi Sadler MD, [facility]    4. Need for hepatitis B screening test  - bictegravir-emtricitab-tenofovir alafenamide (BIKTARVY) -25 MG TABS per tablet; Take 1 tablet by mouth daily  Dispense: 10 tablet; Refill: 0    5. Early syphilis  -Patient states that he took medication as prescribed, did not have follow-up testing  - Mitzi Sadler MD, [facility]    6. Food insecurity  - [facility] for Social Determinants of Health (Primary Care Practices)          Requested Prescriptions     Signed Prescriptions Disp Refills    bictegravir-emtricitab-tenofovir alafenamide (BIKTARVY) -25 MG TABS per tablet 10 tablet 0     Sig: Take 1 tablet by mouth daily    escitalopram (LEXAPRO) 20 MG tablet 30 tablet 0     Sig: Take 1 tablet by mouth daily       Medications Discontinued During This Encounter   Medication Reason    bictegravir-emtricitab-tenofovir alafenamide (BIKTARVY) -25 MG TABS per tablet REORDER    escitalopram (LEXAPRO) 20 MG tablet REORDER       Dereje received counseling on the following healthy behaviors: nutrition, exercise and medication adherence    Discussed use,benefit, and side effects of prescribed medications.  Barriers to medication compliance addressed.      All patient questions answered.  Pt voiced understanding.     Return in about 6 months (around 6/17/2025) for Annual Physical .        Disclaimer: Some orall of this note was transcribed using voice-recognition software.This may cause typographical errors occasionally. Although all effort is made to fix these errors, please do not hesitate to contact our office if there isany concern with the understanding of this note.

## 2024-12-17 NOTE — PATIENT INSTRUCTIONS
Thank you for letting us take care of you today. We hope all your questions were addressed. If a question was overlooked or something else comes to mind after you return home, please contact a member of your Care Team listed below.      Your Care Team at [facility] is Team #  Ren Torres M.D. (Faculty)  Jaiden Ontiveros M.D. (Resident)  Mariana Washington M.D. (Resident)   Jane Garza M.D. (Resident)  Connor Bender M.D. (Resident)  Luann Holman M.D. (Resident)  Caitlin Molina., [facility]  Shirley Portillo, [facility]  Barbara Flores, [facility]  Donnell Chew, [facility]  Lauren Escobar, [facility]  Marlee Ken, [facility]  [facility] Heart (LJ) MARIA DEL ROSARIO Cherry (Clinical Practice Manager)  Tere Friedman [facility] (Clinical Pharmacist)     Office phone number: [phone]    If you need to get in right away due to illness, please be advised we have \"Same Day\" appointments available Monday-Friday. Please call us at [phone] option #3 to schedule your \"Same Day\" appointment.

## 2024-12-18 DIAGNOSIS — F32.A DEPRESSION, UNSPECIFIED DEPRESSION TYPE: ICD-10-CM

## 2024-12-18 DIAGNOSIS — Z11.59 NEED FOR HEPATITIS B SCREENING TEST: ICD-10-CM

## 2024-12-18 RX ORDER — ESCITALOPRAM OXALATE 20 MG/1
20 TABLET ORAL DAILY
Qty: 30 TABLET | Refills: 0 | OUTPATIENT
Start: 2024-12-18

## 2024-12-18 NOTE — TELEPHONE ENCOUNTER
Patient stated he requesting yesterday at his appointment if he can rise the mg on his lexapro from 20 mg to 40 mg

## 2024-12-19 DIAGNOSIS — Z11.59 NEED FOR HEPATITIS B SCREENING TEST: ICD-10-CM

## 2024-12-19 NOTE — TELEPHONE ENCOUNTER
Last visit: 12/17/2024  Last Med refill: 12/17/2024  Does patient have enough medication for 72 hours: No: PHARMACY CALLED AND STATED THEY CAN NOT BREAK OPEN  THE BIKTARVY- THE SCRIPT NEEDS TO STATE 30 TABLETS. PLEASE ADVISE AND THANK YOU    Next Visit Date:  Future Appointments   Date Time Provider Department Center   2/4/2025  4:00 PM Kathleen Hudson MD [facility]   6/17/2025  1:20 PM [facility] MD [facility]       Health Maintenance   Topic Date Due    Meningococcal (ACWY) vaccine (1 - Risk 2-dose series) Never done    COVID-19 Vaccine (1) Never done    Pneumococcal 0-64 years Vaccine (1 of 2 - PCV) Never done    Varicella vaccine (1 of 2 - 13+ 2-dose series) Never done    Measles,Mumps,Rubella (MMR) vaccine (1 of 2 - Risk 2-dose series) Never done    Hepatitis A vaccine (1 of 2 - Risk 2-dose series) Never done    Hepatitis B vaccine (1 of 3 - 19+ 3-dose series) Never done    DTaP/Tdap/Td vaccine (1 - Tdap) Never done    Shingles vaccine (1 of 2) Never done    Flu vaccine (1) 12/17/2025 (Originally 8/1/2024)    Depression Monitoring  12/17/2025    Hepatitis C screen  Completed    Hib vaccine  Aged Out    HPV vaccine  Aged Out    Polio vaccine  Aged Out    Depression Screen  Discontinued    HIV screen  Discontinued       Hemoglobin A1C (%)   Date Value   05/13/2024 5.0             ( goal A1C is < 7)   No components found for: \"LABMICR\"  No components found for: \"LDLCHOLESTEROL\", \"LDLCALC\"    (goal LDL is <100)   AST (U/L)   Date Value   05/13/2024 29     ALT (U/L)   Date Value   05/13/2024 35     BUN (mg/dL)   Date Value   05/13/2024 13     BP Readings from Last 3 Encounters:   12/17/24 128/89   05/13/24 136/83   04/12/24 130/80          (goal 120/80)    All Future Testing planned in [facility]  Lab Frequency Next Occurrence   XR KNEE RIGHT (1-2 VIEWS) Once 04/12/2024   T. Pallidum Ab Once 12/17/2024               Patient Active Problem List:     Encounter to establish care with new doctor

## 2024-12-19 NOTE — TELEPHONE ENCOUNTER
Patient called the office stating the pharmacy will not fill the Biktarvy unless it has a 30 day supply. Please advise

## 2024-12-19 NOTE — TELEPHONE ENCOUNTER
Dereje Carrion was contacted by Kari Infante MA, a Community Health Navigator, regarding a Social Determinants of Health referral.     A message was left with the writer's contact information.    Follow-up attempt.

## 2024-12-20 NOTE — TELEPHONE ENCOUNTER
Dereje Carrion was contacted by Kari Infante MA, a Community Health Navigator, regarding a Social Determinants of Health referral.     A message was left with the writer's contact information.    Second follow-up attempt.

## 2025-01-16 PROBLEM — Z11.59 NEED FOR HEPATITIS B SCREENING TEST: Status: RESOLVED | Noted: 2024-04-12 | Resolved: 2025-01-16

## 2025-01-21 ENCOUNTER — HOSPITAL ENCOUNTER (OUTPATIENT)
Age: 35
Setting detail: SPECIMEN
Discharge: HOME | End: 2025-01-21

## 2025-01-21 ENCOUNTER — OFFICE VISIT (OUTPATIENT)
Dept: FAMILY MEDICINE CLINIC | Age: 35
End: 2025-01-21
Payer: COMMERCIAL

## 2025-01-21 VITALS
SYSTOLIC BLOOD PRESSURE: 135 MMHG | BODY MASS INDEX: 30.88 KG/M2 | HEIGHT: 72 IN | HEART RATE: 80 BPM | WEIGHT: 228 LBS | DIASTOLIC BLOOD PRESSURE: 81 MMHG

## 2025-01-21 DIAGNOSIS — L30.4 INTERTRIGO: Primary | ICD-10-CM

## 2025-01-21 DIAGNOSIS — F32.A DEPRESSION, UNSPECIFIED DEPRESSION TYPE: ICD-10-CM

## 2025-01-21 DIAGNOSIS — Z11.59 NEED FOR HEPATITIS B SCREENING TEST: ICD-10-CM

## 2025-01-21 DIAGNOSIS — A51.9 EARLY SYPHILIS: ICD-10-CM

## 2025-01-21 LAB — T PALLIDUM AB SER QL IA: REACTIVE

## 2025-01-21 PROCEDURE — 99213 OFFICE O/P EST LOW 20 MIN: CPT

## 2025-01-21 RX ORDER — CLOTRIMAZOLE 1 %
CREAM (GRAM) TOPICAL
Qty: 60 G | Refills: 1 | Status: SHIPPED | OUTPATIENT
Start: 2025-01-21 | End: 2025-01-28

## 2025-01-21 RX ORDER — ESCITALOPRAM OXALATE 20 MG/1
20 TABLET ORAL DAILY
Qty: 30 TABLET | Refills: 0 | Status: SHIPPED | OUTPATIENT
Start: 2025-01-21

## 2025-01-21 SDOH — ECONOMIC STABILITY: FOOD INSECURITY: WITHIN THE PAST 12 MONTHS, YOU WORRIED THAT YOUR FOOD WOULD RUN OUT BEFORE YOU GOT MONEY TO BUY MORE.: OFTEN TRUE

## 2025-01-21 SDOH — ECONOMIC STABILITY: FOOD INSECURITY: WITHIN THE PAST 12 MONTHS, THE FOOD YOU BOUGHT JUST DIDN'T LAST AND YOU DIDN'T HAVE MONEY TO GET MORE.: OFTEN TRUE

## 2025-01-21 ASSESSMENT — PATIENT HEALTH QUESTIONNAIRE - PHQ9
SUM OF ALL RESPONSES TO PHQ9 QUESTIONS 1 & 2: 2
2. FEELING DOWN, DEPRESSED OR HOPELESS: MORE THAN HALF THE DAYS
10. IF YOU CHECKED OFF ANY PROBLEMS, HOW DIFFICULT HAVE THESE PROBLEMS MADE IT FOR YOU TO DO YOUR WORK, TAKE CARE OF THINGS AT HOME, OR GET ALONG WITH OTHER PEOPLE: NOT DIFFICULT AT ALL
7. TROUBLE CONCENTRATING ON THINGS, SUCH AS READING THE NEWSPAPER OR WATCHING TELEVISION: MORE THAN HALF THE DAYS
9. THOUGHTS THAT YOU WOULD BE BETTER OFF DEAD, OR OF HURTING YOURSELF: NOT AT ALL
SUM OF ALL RESPONSES TO PHQ QUESTIONS 1-9: 15
SUM OF ALL RESPONSES TO PHQ QUESTIONS 1-9: 15
6. FEELING BAD ABOUT YOURSELF - OR THAT YOU ARE A FAILURE OR HAVE LET YOURSELF OR YOUR FAMILY DOWN: NOT AT ALL
3. TROUBLE FALLING OR STAYING ASLEEP: NEARLY EVERY DAY
SUM OF ALL RESPONSES TO PHQ QUESTIONS 1-9: 15
SUM OF ALL RESPONSES TO PHQ QUESTIONS 1-9: 15
8. MOVING OR SPEAKING SO SLOWLY THAT OTHER PEOPLE COULD HAVE NOTICED. OR THE OPPOSITE, BEING SO FIGETY OR RESTLESS THAT YOU HAVE BEEN MOVING AROUND A LOT MORE THAN USUAL: MORE THAN HALF THE DAYS
1. LITTLE INTEREST OR PLEASURE IN DOING THINGS: NOT AT ALL
5. POOR APPETITE OR OVEREATING: NEARLY EVERY DAY
4. FEELING TIRED OR HAVING LITTLE ENERGY: NEARLY EVERY DAY

## 2025-01-21 ASSESSMENT — ENCOUNTER SYMPTOMS
DIARRHEA: 0
SHORTNESS OF BREATH: 0
NAUSEA: 0
VOMITING: 0
CONSTIPATION: 0
ABDOMINAL PAIN: 0

## 2025-01-21 NOTE — PROGRESS NOTES
[facility] [place] [facility] - Outpatient Note      Subjective:    Dereje Carrion is a 34 y.o. male with  has no past medical history on file.    Presented to the office today for:  Chief Complaint   Patient presents with    Rash     Discuss meds and therapist   Rash under both arm for about 2 weeks       HPI  Patient is a 34-year-old male seen today to discuss his medications and complaints of rash under both his armpits for the last 2 weeks.    Rash  -Endorsing rash that started progressing under bilateral armpits 2 weeks ago.  States that it became inflamed had pustules and was draining.  Tried applying Vaseline other emollients  -Patient was sweating a lot in that area  -No prior history of similar rash  -Rash has improved, now appears more hyperpigmented    Emotional support animal  - Has had his dog since June 2024  - Requiring paper work filled out to get more space in his apartment     History of HIV  - taking Biktarvy, requires refills  - Hasn't followed up with ID. Needs contact information to schedule appointment     Depression/Anxiety  - Med list includes lexapro and seroquel  -Has not taken Seroquel in a long time.  Only taking Lexapro 20 mg at this time  - States that he hasn't taken Seroquel in a while  -PHQ-9 score of 15  -States that he wanted a higher dose of Lexapro.  Informed him that 20 mg is the highest dose.  Will provide him with resources to follow-up with [facility] to see a psychiatrist and manage his medications    Review of Systems   Constitutional:  Negative for fatigue.   Eyes:  Negative for visual disturbance.   Respiratory:  Negative for shortness of breath.    Cardiovascular:  Negative for chest pain and palpitations.   Gastrointestinal:  Negative for abdominal pain, constipation, diarrhea, nausea and vomiting.   Endocrine: Negative.    Genitourinary:  Negative for dysuria.   Musculoskeletal: Negative.    Skin:  Positive for rash

## 2025-01-21 NOTE — PATIENT INSTRUCTIONS
Thank you for letting us take care of you today. We hope all your questions were addressed. If a question was overlooked or something else comes to mind after you return home, please contact a member of your Care Team listed below.      Your Care Team at [facility] is Team #2  Devin Dang M.D. (Faculty)  Naima Rivera M.D. (Resident)  Gladys Small M.D. (Resident)  Sahil Dozier M.D. (Resident)  Kathleen Hudson M.D. (Resident)  Yudelka Manning M.D. (Resident)  Donnell Portillo, [facility]  Michelle Chew, [facility]  Caitlin Molina,  [facility]  Lauren Escobar, [facility]  Marlee Ken, [facility]  Barbara Flores, [facility]  [facility] (LJ) Dilip FABRICIO (Clinical Practice Manager)  Tere Friedman [facility] (Clinical Pharmacist)     Office phone number: [phone]    If you need to get in right away due to illness, please be advised we have \"Same Day\" appointments available Monday-Friday. Please call us at [phone] option #3 to schedule your \"Same Day\" appointment.

## 2025-01-21 NOTE — PROGRESS NOTES
Attending Physician Statement  I have discussed the care of Dereje Carrion, including pertinent history and exam findings,  with the resident. I have reviewed the key elements of all parts of the encounter with the resident.  I agree with the assessment, plan and orders as documented by the resident.  (GE Modifier)    Tresa Long MD

## 2025-01-24 LAB
T PALLIDUM AB SER QL HA: REACTIVE
VDRL SER-TITR: NONREACTIVE {TITER}

## 2025-02-20 PROBLEM — Z11.59 NEED FOR HEPATITIS B SCREENING TEST: Status: RESOLVED | Noted: 2025-01-21 | Resolved: 2025-02-20

## 2025-03-20 ENCOUNTER — OFFICE VISIT (OUTPATIENT)
Dept: INFECTIOUS DISEASES | Age: 35
End: 2025-03-20
Payer: COMMERCIAL

## 2025-03-20 VITALS
TEMPERATURE: 97 F | HEART RATE: 79 BPM | HEIGHT: 72 IN | SYSTOLIC BLOOD PRESSURE: 138 MMHG | BODY MASS INDEX: 31.42 KG/M2 | WEIGHT: 232 LBS | DIASTOLIC BLOOD PRESSURE: 87 MMHG

## 2025-03-20 DIAGNOSIS — Z86.19 HISTORY OF SYPHILIS: ICD-10-CM

## 2025-03-20 DIAGNOSIS — Z21 ASYMPTOMATIC HIV INFECTION, WITH NO HISTORY OF HIV-RELATED ILLNESS (HCC): Primary | ICD-10-CM

## 2025-03-20 PROCEDURE — 99203 OFFICE O/P NEW LOW 30 MIN: CPT | Performed by: INTERNAL MEDICINE

## 2025-03-20 ASSESSMENT — ENCOUNTER SYMPTOMS
RESPIRATORY NEGATIVE: 1
GASTROINTESTINAL NEGATIVE: 1

## 2025-03-20 NOTE — PROGRESS NOTES
[facility]   Office Consult Note  Today's Date and Time: 3/20/2025, 11:13 AM    Impression:     1. Asymptomatic HIV infection, with no history of HIV-related illness (HCC)    2. History of syphilis         Recommendations   .    Assessment & Plan  1. Human Immunodeficiency Virus (HIV) infection.  He was diagnosed with HIV in 2010 and has been on Biktarvy for approximately six years. He has been off Biktarvy for the past two months. His last CD4 count on 04/13/2024 was 477, and his viral load was 2000 copies. Baseline laboratory tests will be conducted today to assess his current status. He will be restarted on Biktarvy, with a prescription provided for six months. A follow-up lab test will be scheduled in three months to monitor his response to the medication. He is advised to disclose his HIV status to any potential sexual partners and to use protection during sexual intercourse.    2. Depression.  He reports a history of depression. He is currently receiving mental health care at [facility].    3. Anxiety.  He reports a history of anxiety. He is currently receiving mental health care at [facility].    4. Post-Traumatic Stress Disorder (PTSD).  He reports a history of PTSD. He is currently receiving mental health care at [facility].    Follow-up  The patient will follow up in four months.           I have ordered the following medications/ labs:  Orders Placed This Encounter   Procedures    CBC     Standing Status:   Future     Expected Date:   3/21/2025     Expiration Date:   4/30/2025    Comprehensive Metabolic Panel with Bilirubin     Standing Status:   Future     Expected Date:   3/21/2025     Expiration Date:   4/30/2025    HIV RNA, Quantitative, PCR     Standing Status:   Future     Expected Date:   3/21/2025     Expiration Date:   4/30/2025    Lymphocyte Subset     Standing Status:   Future     Expected Date:   3/21/2025     Expiration Date:   4/30/2025      Orders Placed This Encounter

## 2025-04-07 DIAGNOSIS — F32.A DEPRESSION, UNSPECIFIED DEPRESSION TYPE: ICD-10-CM

## 2025-04-07 RX ORDER — ESCITALOPRAM OXALATE 20 MG/1
20 TABLET ORAL DAILY
Qty: 60 TABLET | Refills: 2 | Status: SHIPPED | OUTPATIENT
Start: 2025-04-07

## 2025-04-07 NOTE — TELEPHONE ENCOUNTER
Last visit: 1/21/25  Last Med refill: 1/21/25  Does patient have enough medication for 72 hours: no    Next Visit Date:  Future Appointments   Date Time Provider Department Center   6/19/2025 11:30 AM Aki Nova MD INFT DISEASE [facility]       Health Maintenance   Topic Date Due    Meningococcal (ACWY) vaccine (1 - Risk 2-dose series) Never done    COVID-19 Vaccine (1) Never done    Varicella vaccine (1 of 2 - 13+ 2-dose series) Never done    Measles,Mumps,Rubella (MMR) vaccine (1 of 2 - Risk 2-dose series) Never done    Hepatitis A vaccine (1 of 2 - Risk 2-dose series) Never done    Hepatitis B vaccine (1 of 3 - 19+ 3-dose series) Never done    DTaP/Tdap/Td vaccine (1 - Tdap) Never done    Shingles vaccine (1 of 2) Never done    Pneumococcal 0-49 years Vaccine (1 of 2 - PCV) Never done    Flu vaccine (Season Ended) 12/17/2025 (Originally 8/1/2025)    Depression Monitoring  01/21/2026    Hepatitis C screen  Completed    Hib vaccine  Aged Out    HPV vaccine  Aged Out    Polio vaccine  Aged Out    Meningococcal B vaccine  Aged Out    Depression Screen  Discontinued    HIV screen  Discontinued       Hemoglobin A1C (%)   Date Value   05/13/2024 5.0             ( goal A1C is < 7)   No components found for: \"LABMICR\"  No components found for: \"LDLCHOLESTEROL\", \"LDLCALC\"    (goal LDL is <100)   AST (U/L)   Date Value   05/13/2024 29     ALT (U/L)   Date Value   05/13/2024 35     BUN (mg/dL)   Date Value   05/13/2024 13     BP Readings from Last 3 Encounters:   03/20/25 138/87   01/21/25 135/81   12/17/24 128/89          (goal 120/80)    All Future Testing planned in [facility]  Lab Frequency Next Occurrence   XR KNEE RIGHT (1-2 VIEWS) Once 04/12/2024   CBC Once 03/21/2025   Comprehensive Metabolic Panel with Bilirubin Once 03/21/2025   HIV RNA, Quantitative, PCR Once 03/21/2025   Lymphocyte Subset Once 03/21/2025               Patient Active Problem List:     Encounter to establish care with new doctor

## 2025-06-11 NOTE — PATIENT INSTRUCTIONS
resources)    Area Office on Aging of [facility] ([facility]):  What they offer: Medical cab rides for seniors and referral to community resources  Phone Number: [phone]     Area Agency on Aging, District 5:    [place], [place], [place], [place], [place], [place], [place], [place], [facility]:  What they offer: Referral to transportation and other resources for seniors.  Phone Number: [phone]     [facility] Community Action Commission ([employer]):   [place], [place], [place]icity Jack, [place]rya Amos, and [place] counites  What they offer: referral to community transportation and other resources.  Phone Number: 180.745.5743    [employer]:  What they offer: Emergency and rental assistance  Phone Number: (676) 582-3163      [employer].  What they offer: Transitional housing and shelter for women  Phone Number: (929) 834-7610      [facility]  What they offer: Homeless shelter for homeless men and women with children.  Phone number: 1-189.971.5563  Address: [address] [place] [address]. [facility]:  What they Offer: Homeless shelter for families & Pregnant women  Phone Number: (903) 831-9116 ext: [address] [place] [address]   What they offer: Homeless shelter for men.  Phone number: 302.739.3239  Address: [address] [place] [address]:  What they Offer: Homeless Permanent Supportive Housing for Women with Chronic/Severe Mental Illness, Physical Disabilities, and Substance Abuse Issues  Phone Number: (733) 408-5008    Website: www.[employer]    Volunteers of [facility]:  What they offer: Ex-Offender [place] [employer]  Phone Number: (644) 460-9508  Website: www.[employer].Grafighters    [employer] [place] ([facility]):  What they offer: Advocacy services for renters and homeowners.  Phone Number: 890.117.3266    [facility]):  What they offer: Shelter and advocacy services for victims of domestic violence.  Phone Number: 685.764.1167 (24/7 line, select 
[employer] [place]